# Patient Record
Sex: FEMALE | Race: WHITE | NOT HISPANIC OR LATINO | Employment: UNEMPLOYED | URBAN - NONMETROPOLITAN AREA
[De-identification: names, ages, dates, MRNs, and addresses within clinical notes are randomized per-mention and may not be internally consistent; named-entity substitution may affect disease eponyms.]

---

## 2018-10-26 RX ORDER — NAPROXEN 500 MG/1
500 TABLET ORAL 2 TIMES DAILY WITH MEALS
COMMUNITY
End: 2018-10-29

## 2018-10-26 RX ORDER — LORATADINE 10 MG/1
10 TABLET ORAL DAILY
COMMUNITY
End: 2019-01-14

## 2018-10-26 RX ORDER — OMEPRAZOLE 20 MG/1
20 CAPSULE, DELAYED RELEASE ORAL DAILY
COMMUNITY
End: 2018-10-29

## 2018-10-26 RX ORDER — SUCRALFATE 1 G/1
1 TABLET ORAL 4 TIMES DAILY
COMMUNITY
End: 2018-10-29

## 2018-10-26 RX ORDER — QUETIAPINE FUMARATE 100 MG/1
100 TABLET, FILM COATED ORAL NIGHTLY
COMMUNITY
End: 2019-01-14

## 2018-10-26 RX ORDER — ONDANSETRON 4 MG/1
4 TABLET, FILM COATED ORAL EVERY 8 HOURS PRN
COMMUNITY
End: 2018-10-29

## 2018-10-26 RX ORDER — METHOCARBAMOL 750 MG/1
750 TABLET, FILM COATED ORAL 4 TIMES DAILY PRN
COMMUNITY
End: 2018-10-29

## 2018-10-29 ENCOUNTER — OFFICE VISIT (OUTPATIENT)
Dept: SURGERY | Facility: CLINIC | Age: 23
End: 2018-10-29

## 2018-10-29 VITALS
WEIGHT: 239.8 LBS | HEART RATE: 115 BPM | SYSTOLIC BLOOD PRESSURE: 118 MMHG | TEMPERATURE: 97.8 F | HEIGHT: 68 IN | BODY MASS INDEX: 36.34 KG/M2 | DIASTOLIC BLOOD PRESSURE: 84 MMHG | OXYGEN SATURATION: 99 %

## 2018-10-29 DIAGNOSIS — R10.33 PERIUMBILICAL ABDOMINAL PAIN: Primary | ICD-10-CM

## 2018-10-29 DIAGNOSIS — R11.2 NAUSEA AND VOMITING, INTRACTABILITY OF VOMITING NOT SPECIFIED, UNSPECIFIED VOMITING TYPE: ICD-10-CM

## 2018-10-29 PROCEDURE — 99244 OFF/OP CNSLTJ NEW/EST MOD 40: CPT | Performed by: SURGERY

## 2018-10-29 RX ORDER — PANTOPRAZOLE SODIUM 20 MG/1
20 TABLET, DELAYED RELEASE ORAL DAILY
COMMUNITY
End: 2019-01-14

## 2018-10-29 NOTE — PROGRESS NOTES
Patient: Ritika Moore    YOB: 1995    Date: 10/29/2018    Primary Care Provider: Chinmay Myers MD    Reason for Consultation:Epigastric pain, GERD    Chief Complaint   Patient presents with   • Nausea       Subjective .     History of present illness: Patient has been referred in consultation for further workup and evaluation of a four-month history of intermittent multiple complaints.  She had sudden onset of severe crampy periumbilical abdominal pain associated with nausea and vomiting and some diarrhea.  Episodically she continues to have these episodes especially after eating foods such as dairy and fatty greasy foods.  She has had mild hematemesis also with her nausea and vomiting.  She states the pain as a burning sensation.  Pain does not seem to radiate.  The diarrhea only occurs when she has these episodes and otherwise has normal bowel movements.  No black or bloody bowel movements.    The following portions of the patient's history were reviewed and updated as appropriate: allergies, current medications, past family history, past medical history, past social history, past surgical history and problem list.      Review of Systems   Constitutional: Negative for chills, fever and unexpected weight change.   HENT: Negative for hearing loss, trouble swallowing and voice change.    Eyes: Negative for visual disturbance.   Respiratory: Negative for apnea, cough, chest tightness, shortness of breath and wheezing.    Cardiovascular: Negative for chest pain, palpitations and leg swelling.   Gastrointestinal: Positive for abdominal distention, abdominal pain, diarrhea, nausea and vomiting. Negative for anal bleeding, blood in stool, constipation and rectal pain.   Endocrine: Negative for cold intolerance and heat intolerance.   Genitourinary: Negative for difficulty urinating, dysuria and flank pain.   Musculoskeletal: Negative for back pain and gait problem.   Skin: Negative for color  "change, rash and wound.   Neurological: Negative for dizziness, syncope, speech difficulty, weakness, light-headedness, numbness and headaches.   Hematological: Negative for adenopathy. Does not bruise/bleed easily.   Psychiatric/Behavioral: Negative for confusion. The patient is not nervous/anxious.        History:  Past Medical History:   Diagnosis Date   • Depression        Past Surgical History:   Procedure Laterality Date   • ADENOIDECTOMY     • HERNIA REPAIR     • MOUTH SURGERY     • TONSILLECTOMY         Family History   Problem Relation Age of Onset   • Hypertension Mother    • No Known Problems Father    • Hypertension Brother        Social History   Substance Use Topics   • Smoking status: Current Every Day Smoker   • Smokeless tobacco: Never Used   • Alcohol use No       Allergies:  Allergies   Allergen Reactions   • Lamictal [Lamotrigine] Swelling       Medications:    Current Outpatient Prescriptions:   •  loratadine (CLARITIN) 10 MG tablet, Take 10 mg by mouth Daily., Disp: , Rfl:   •  pantoprazole (PROTONIX) 20 MG EC tablet, Take 20 mg by mouth Daily., Disp: , Rfl:   •  QUEtiapine (SEROquel) 100 MG tablet, Take 100 mg by mouth Every Night., Disp: , Rfl:     Objective     Vital Signs:   Vitals:    10/29/18 1437   BP: 118/84   Pulse: 115   Temp: 97.8 °F (36.6 °C)   TempSrc: Temporal Artery    SpO2: 99%   Weight: 109 kg (239 lb 12.8 oz)   Height: 172.7 cm (68\")       Physical Exam:   General Appearance:    Alert, cooperative, in no acute distress   Head:    Normocephalic, without obvious abnormality, atraumatic   Eyes:            Normal.  No scleral icterus.  PERRLA    Lungs:     Clear to auscultation,respirations regular, even and                  unlabored    Heart:    Regular rhythm and normal rate, normal S1 and S2, no            murmur   Abdomen:     Normal bowel sounds, no masses, no organomegaly, soft        non-tender, non-distended, no guarding, some epigastric abdominal tenderness  "   Extremities:   Moves all extremities well, no edema, no cyanosis, no             redness   Skin:   No bleeding, bruising or rash   Neurologic:   Normal without gross deficits.   Psychiatric: No evidence of depression or anxiety        Results Review:   I reviewed the patient's new clinical results.  Labs and CT were reviewed.  The gallbladder ultrasound has been called for.    Review of Systems was reviewed and confirmed as accurate today.    Assessment/Plan     1. Periumbilical abdominal pain    2. Nausea and vomiting, intractability of vomiting not specified, unspecified vomiting type        I recommend an upper endoscopy to further evaluate.  She understands this procedure as well as the risks of bleeding and perforation and she understands the ramifications of these potential complications and she wishes to proceed.  I think colonoscopy will be of low yield at this time but we can consider this in the future.  I will also order a HIDA scan    Electronically signed by Heber Neri MD  10/29/18 2:42 PM      Portions of this note have been scribed for Heber Neri MD by Michelle Milligan. 10/29/2018  2:51 PM

## 2018-11-05 ENCOUNTER — OUTSIDE FACILITY SERVICE (OUTPATIENT)
Dept: SURGERY | Facility: CLINIC | Age: 23
End: 2018-11-05

## 2018-11-05 PROCEDURE — 43239 EGD BIOPSY SINGLE/MULTIPLE: CPT | Performed by: SURGERY

## 2018-11-12 ENCOUNTER — OFFICE VISIT (OUTPATIENT)
Dept: SURGERY | Facility: CLINIC | Age: 23
End: 2018-11-12

## 2018-11-12 VITALS
WEIGHT: 240.3 LBS | HEIGHT: 68 IN | OXYGEN SATURATION: 98 % | BODY MASS INDEX: 36.42 KG/M2 | SYSTOLIC BLOOD PRESSURE: 132 MMHG | DIASTOLIC BLOOD PRESSURE: 86 MMHG | TEMPERATURE: 98.3 F | HEART RATE: 115 BPM

## 2018-11-12 DIAGNOSIS — R10.33 PERIUMBILICAL PAIN: Primary | ICD-10-CM

## 2018-11-12 DIAGNOSIS — R11.2 NAUSEA AND VOMITING, INTRACTABILITY OF VOMITING NOT SPECIFIED, UNSPECIFIED VOMITING TYPE: ICD-10-CM

## 2018-11-12 PROCEDURE — 99214 OFFICE O/P EST MOD 30 MIN: CPT | Performed by: SURGERY

## 2018-11-12 RX ORDER — PROMETHAZINE HYDROCHLORIDE 25 MG/1
25 TABLET ORAL EVERY 6 HOURS PRN
Qty: 15 TABLET | Refills: 1 | Status: SHIPPED | OUTPATIENT
Start: 2018-11-12 | End: 2019-01-14

## 2018-11-12 RX ORDER — SUCRALFATE 1 G/1
1 TABLET ORAL 4 TIMES DAILY
COMMUNITY
End: 2019-01-14

## 2018-11-12 RX ORDER — DICYCLOMINE HCL 20 MG
20 TABLET ORAL
Qty: 30 TABLET | Refills: 1 | Status: SHIPPED | OUTPATIENT
Start: 2018-11-12 | End: 2019-01-14

## 2018-11-12 NOTE — PROGRESS NOTES
Patient: Ritika Moore    YOB: 1995    Date: 11/12/2018    Primary Care Provider: Chinmay Myers MD    Reason for Consultation: Follow-up EGD    Chief Complaint   Patient presents with   • Follow-up     EGD with biopsy       History of present illness:  Patient here in follow-up for her various GI complaints and abdominal pain.  She's had the abdominal pain for about 8 months.  Perhaps has improved as of late but she continues to have sharp and crampy periumbilical pain that radiates to the sides.  Associated with nausea and vomiting.  She previously had diarrhea and recently had an episode of constipation.  The diarrhea has resolved.  Eating makes her symptoms worse.  She has associated loading and belching  The following portions of the patient's history were reviewed and updated as appropriate: allergies, current medications, past family history, past medical history, past social history, past surgical history and problem list.      Review of Systems   Constitutional: Negative for chills, fever and unexpected weight change.   HENT: Negative for hearing loss, trouble swallowing and voice change.    Eyes: Negative for visual disturbance.   Respiratory: Negative for apnea, cough, chest tightness, shortness of breath and wheezing.    Cardiovascular: Negative for chest pain, palpitations and leg swelling.   Gastrointestinal: Positive for abdominal pain, constipation, nausea and vomiting. Negative for abdominal distention, anal bleeding, blood in stool, diarrhea and rectal pain.   Endocrine: Negative for cold intolerance and heat intolerance.   Genitourinary: Negative for difficulty urinating, dysuria and flank pain.   Musculoskeletal: Negative for back pain and gait problem.   Skin: Negative for color change, rash and wound.   Neurological: Negative for dizziness, syncope, speech difficulty, weakness, light-headedness, numbness and headaches.   Hematological: Negative for adenopathy. Does not  "bruise/bleed easily.   Psychiatric/Behavioral: Negative for confusion. The patient is not nervous/anxious.        Vital Signs:   Vitals:    11/12/18 1511   BP: 132/86   Pulse: 115   Temp: 98.3 °F (36.8 °C)   SpO2: 98%   Weight: 109 kg (240 lb 4.8 oz)   Height: 172.7 cm (67.99\")       Allergies:  Allergies   Allergen Reactions   • Lamictal [Lamotrigine] Swelling   History:       Past Medical History:   Diagnosis Date   • Depression                 Past Surgical History:   Procedure Laterality Date   • ADENOIDECTOMY       • HERNIA REPAIR       • MOUTH SURGERY       • TONSILLECTOMY                   Family History   Problem Relation Age of Onset   • Hypertension Mother     • No Known Problems Father     • Hypertension Brother                Social History   Substance Use Topics   • Smoking status: Current Every Day Smoker   • Smokeless tobacco: Never Used   • Alcohol use No             Medications:    Current Outpatient Medications:   •  loratadine (CLARITIN) 10 MG tablet, Take 10 mg by mouth Daily., Disp: , Rfl:   •  pantoprazole (PROTONIX) 20 MG EC tablet, Take 20 mg by mouth Daily., Disp: , Rfl:   •  QUEtiapine (SEROquel) 100 MG tablet, Take 100 mg by mouth Every Night., Disp: , Rfl:   •  sucralfate (CARAFATE) 1 g tablet, Take 1 g by mouth 4 (Four) Times a Day., Disp: , Rfl:     Physical Exam:   General Appearance:    Alert, cooperative, in no acute distress   Head:    Normocephalic, without obvious abnormality, atraumatic   Eyes:            Normal.  No scleral icterus.  PERRLA     Heart:    Regular rhythm and normal rate, normal S1 and S2, no            murmur   Abdomen:     Normal bowel sounds, no masses, no organomegaly, soft        non-tender, non-distended, no guarding, minimal periumbilical tenderness but no obvious hernia or other abnormality    Neurologic:   Normal without gross deficits.   Psychiatric: No evidence of depression or anxiety          Results Review:   I reviewed the patient's new clinical " results.  I reviewed the CT scan including the actual films that she had previously here at [facility].  I find no evidence of periumbilical hernia or other significant abnormality.  HIDA scan was normal.  Recent laboratory studies from last week were also reviewed.  Leukocytosis has resolved and labs were otherwise normal.    Assessment / Plan:    1. Periumbilical pain    2. Nausea and vomiting, intractability of vomiting not specified, unspecified vomiting type        No abnormality on upper endoscopy to explain her symptoms.  Continue the Protonix and she does have some esophagitis although mild.  No evidence of Dolan's on biopsy.  Gallbladder workup was normal and CT scan essentially unremarkable.  Overall she seems to be better than the first several months of having her above symptoms.  Although she continues to have episodes of nausea and vomiting she is eating somewhat better.  I'll try some Bentyl for abdominal pain.  Phenergan for her intermittent nausea or vomiting.  She states the Zofran was not helping any longer.  FODMAP diet.  Follow-up in 2 weeks    Electronically signed by Heber Neri MD  11/12/18    Portions of this note has been scribed for Heber Neri MD by Sangeetha Wong. 11/12/2018  3:51 PM

## 2019-01-14 ENCOUNTER — OFFICE VISIT (OUTPATIENT)
Dept: SURGERY | Facility: CLINIC | Age: 24
End: 2019-01-14

## 2019-01-14 ENCOUNTER — TELEPHONE (OUTPATIENT)
Dept: SURGERY | Facility: CLINIC | Age: 24
End: 2019-01-14

## 2019-01-14 VITALS
TEMPERATURE: 98.5 F | HEIGHT: 68 IN | SYSTOLIC BLOOD PRESSURE: 132 MMHG | DIASTOLIC BLOOD PRESSURE: 80 MMHG | BODY MASS INDEX: 37.8 KG/M2 | OXYGEN SATURATION: 100 % | WEIGHT: 249.4 LBS | HEART RATE: 68 BPM

## 2019-01-14 DIAGNOSIS — R10.11 RIGHT UPPER QUADRANT ABDOMINAL PAIN: Primary | ICD-10-CM

## 2019-01-14 DIAGNOSIS — R11.2 NAUSEA AND VOMITING, INTRACTABILITY OF VOMITING NOT SPECIFIED, UNSPECIFIED VOMITING TYPE: ICD-10-CM

## 2019-01-14 PROCEDURE — 99213 OFFICE O/P EST LOW 20 MIN: CPT | Performed by: SURGERY

## 2019-01-14 RX ORDER — OMEPRAZOLE 40 MG/1
40 CAPSULE, DELAYED RELEASE ORAL DAILY
Qty: 30 CAPSULE | Refills: 1 | Status: SHIPPED | OUTPATIENT
Start: 2019-01-14 | End: 2020-01-14

## 2019-01-14 RX ORDER — OMEPRAZOLE 20 MG/1
20 CAPSULE, DELAYED RELEASE ORAL DAILY
COMMUNITY
End: 2019-01-14 | Stop reason: ALTCHOICE

## 2019-01-14 RX ORDER — PROMETHAZINE HYDROCHLORIDE 25 MG/1
25 SUPPOSITORY RECTAL EVERY 6 HOURS PRN
Qty: 16 SUPPOSITORY | Refills: 1 | Status: SHIPPED | OUTPATIENT
Start: 2019-01-14

## 2019-01-14 NOTE — PROGRESS NOTES
Patient: Ritika Moore    YOB: 1995    Date: 01/14/2019    Primary Care Provider: Chinmay Myers MD    Chief Complaint   Patient presents with   • Abdominal Pain   • Vomiting       Subjective .     History of present illness: Patient returns today for evaluation of nausea and vomiting and abdominal pain.  Pain is in the right side and in the right upper quadrant and also into the chest.  She has not been taking her omeprazole.  Pain is moderate in intensity.  She did go to the emergency room for evaluation and no significant abnormality identified.    The following portions of the patient's history were reviewed and updated as appropriate: allergies, current medications, past family history, past medical history, past social history, past surgical history and problem list.       Review of Systems   Constitutional: Negative for chills, fever and unexpected weight change.   HENT: Negative for hearing loss, trouble swallowing and voice change.    Eyes: Negative for visual disturbance.   Respiratory: Negative for apnea, cough, chest tightness, shortness of breath and wheezing.    Cardiovascular: Negative for chest pain, palpitations and leg swelling.   Gastrointestinal: Positive for abdominal pain, constipation, nausea and vomiting. Negative for abdominal distention, anal bleeding, blood in stool, diarrhea and rectal pain.   Endocrine: Negative for cold intolerance and heat intolerance.   Genitourinary: Negative for difficulty urinating, dysuria and flank pain.   Musculoskeletal: Negative for back pain and gait problem.   Skin: Negative for color change, rash and wound.   Neurological: Negative for dizziness, syncope, speech difficulty, weakness, light-headedness, numbness and headaches.   Hematological: Negative for adenopathy. Does not bruise/bleed easily.   Psychiatric/Behavioral: Negative for confusion. The patient is not nervous/anxious.        Allergies:  Allergies   Allergen Reactions   •  "Lamictal [Lamotrigine] Swelling       Medications:    Current Outpatient Medications:   •  omeprazole (priLOSEC) 20 MG capsule, Take 20 mg by mouth Daily., Disp: , Rfl:     History:  Past Medical History:   Diagnosis Date   • Depression        Past Surgical History:   Procedure Laterality Date   • ADENOIDECTOMY     • HERNIA REPAIR     • MOUTH SURGERY     • TONSILLECTOMY         Family History   Problem Relation Age of Onset   • Hypertension Mother    • No Known Problems Father    • Hypertension Brother        Social History     Tobacco Use   • Smoking status: Current Every Day Smoker   • Smokeless tobacco: Never Used   Substance Use Topics   • Alcohol use: No   • Drug use: No        Objective     Vital Signs:   Vitals:    01/14/19 1236   BP: 132/80   Pulse: 68   Temp: 98.5 °F (36.9 °C)   SpO2: 100%   Weight: 113 kg (249 lb 6.4 oz)   Height: 172.7 cm (67.99\")       Physical Exam:   General Appearance:    Alert, cooperative, in no acute distress   Head:    Normocephalic, without obvious abnormality, atraumatic   Eyes:            Normal.  No scleral icterus.  PERRLA    Lungs:     Clear to auscultation,respirations regular, even and                  unlabored    Heart:    Regular rhythm and normal rate, normal S1 and S2, no            murmur   Abdomen:     Soft and nondistended but with tenderness especially in the right side.  Upper and lower quadrants.     Extremities:   Moves all extremities well, no edema, no cyanosis, no             redness   Skin:   No bleeding, bruising or rash   Neurologic:   Normal without gross deficits.   Psychiatric: No evidence of depression or anxiety        Results Review:   I reviewed the patient's new clinical results.  CT and labs were reviewed    Assessment/Plan     1. Right upper quadrant abdominal pain    2. Nausea and vomiting, intractability of vomiting not specified, unspecified vomiting type      Unsure as to the etiology of her symptoms.  She has had extensive workup and this " has been going on for about 11 months.  I would like to go ahead and review the CT scan personally and I have asked them to schedule the disc for me.  I will order Phenergan per rectum and reorder her omeprazole.  She also has some constipation issues and I have given them instructions for this.  Follow-up in a week.  Also I have recommended GI evaluation.    Electronically signed by Heber Neri MD  01/14/19    Portions of this note has been scribed for Heber Neri MD by Sangeetha Wong. 1/14/2019  1:39 PM    .

## 2019-01-14 NOTE — TELEPHONE ENCOUNTER
Pts mother called and said daughter was having continous vomiting has had her to [facility] & [facility]  Several times and all they do is give her GI cocktail and send her home .  She took her to [facility] over the weekend,I made pt appt to see Dr Neri on Tues 01/15/19  But Mother wants to know if there is anything she can do for her vomiting & pain until then her # is [phone]